# Patient Record
Sex: FEMALE | NOT HISPANIC OR LATINO | Employment: UNEMPLOYED | URBAN - METROPOLITAN AREA
[De-identification: names, ages, dates, MRNs, and addresses within clinical notes are randomized per-mention and may not be internally consistent; named-entity substitution may affect disease eponyms.]

---

## 2023-07-21 ENCOUNTER — OFFICE VISIT (OUTPATIENT)
Dept: PRIMARY CARE | Facility: CLINIC | Age: 38
End: 2023-07-21
Payer: MEDICAID

## 2023-07-21 VITALS
SYSTOLIC BLOOD PRESSURE: 148 MMHG | WEIGHT: 263 LBS | OXYGEN SATURATION: 97 % | DIASTOLIC BLOOD PRESSURE: 97 MMHG | RESPIRATION RATE: 16 BRPM | HEIGHT: 60 IN | BODY MASS INDEX: 51.63 KG/M2 | TEMPERATURE: 98.4 F | HEART RATE: 72 BPM

## 2023-07-21 DIAGNOSIS — E66.01 MORBID OBESITY (MULTI): ICD-10-CM

## 2023-07-21 DIAGNOSIS — Z00.00 HEALTH CARE MAINTENANCE: ICD-10-CM

## 2023-07-21 DIAGNOSIS — R40.0 DAYTIME SLEEPINESS: ICD-10-CM

## 2023-07-21 DIAGNOSIS — R06.83 SNORING: ICD-10-CM

## 2023-07-21 DIAGNOSIS — I10 PRIMARY HYPERTENSION: ICD-10-CM

## 2023-07-21 DIAGNOSIS — R53.83 OTHER FATIGUE: Primary | ICD-10-CM

## 2023-07-21 LAB
ALANINE AMINOTRANSFERASE (SGPT) (U/L) IN SER/PLAS: 20 U/L (ref 7–45)
ALBUMIN (G/DL) IN SER/PLAS: 4.6 G/DL (ref 3.4–5)
ALKALINE PHOSPHATASE (U/L) IN SER/PLAS: 56 U/L (ref 33–110)
ANION GAP IN SER/PLAS: 15 MMOL/L (ref 10–20)
ASPARTATE AMINOTRANSFERASE (SGOT) (U/L) IN SER/PLAS: 15 U/L (ref 9–39)
BILIRUBIN TOTAL (MG/DL) IN SER/PLAS: 2 MG/DL (ref 0–1.2)
CALCIDIOL (25 OH VITAMIN D3) (NG/ML) IN SER/PLAS: 48 NG/ML
CALCIUM (MG/DL) IN SER/PLAS: 9.6 MG/DL (ref 8.6–10.6)
CARBON DIOXIDE, TOTAL (MMOL/L) IN SER/PLAS: 27 MMOL/L (ref 21–32)
CHLORIDE (MMOL/L) IN SER/PLAS: 101 MMOL/L (ref 98–107)
CHOLESTEROL (MG/DL) IN SER/PLAS: 142 MG/DL (ref 0–199)
CHOLESTEROL IN HDL (MG/DL) IN SER/PLAS: 43.1 MG/DL
CHOLESTEROL/HDL RATIO: 3.3
CREATININE (MG/DL) IN SER/PLAS: 0.87 MG/DL (ref 0.5–1.05)
ERYTHROCYTE DISTRIBUTION WIDTH (RATIO) BY AUTOMATED COUNT: 14.3 % (ref 11.5–14.5)
ERYTHROCYTE MEAN CORPUSCULAR HEMOGLOBIN CONCENTRATION (G/DL) BY AUTOMATED: 31.9 G/DL (ref 32–36)
ERYTHROCYTE MEAN CORPUSCULAR VOLUME (FL) BY AUTOMATED COUNT: 95 FL (ref 80–100)
ERYTHROCYTES (10*6/UL) IN BLOOD BY AUTOMATED COUNT: 4.55 X10E12/L (ref 4–5.2)
ESTIMATED AVERAGE GLUCOSE FOR HBA1C: 111 MG/DL
FERRITIN (UG/LL) IN SER/PLAS: 122 UG/L (ref 8–150)
GFR FEMALE: 87 ML/MIN/1.73M2
GLUCOSE (MG/DL) IN SER/PLAS: 95 MG/DL (ref 74–99)
HEMATOCRIT (%) IN BLOOD BY AUTOMATED COUNT: 43.3 % (ref 36–46)
HEMOGLOBIN (G/DL) IN BLOOD: 13.8 G/DL (ref 12–16)
HEMOGLOBIN A1C/HEMOGLOBIN TOTAL IN BLOOD: 5.5 %
IRON (UG/DL) IN SER/PLAS: 127 UG/DL (ref 35–150)
IRON BINDING CAPACITY (UG/DL) IN SER/PLAS: 351 UG/DL (ref 240–445)
IRON SATURATION (%) IN SER/PLAS: 36 % (ref 25–45)
LDL: 82 MG/DL (ref 0–99)
LEUKOCYTES (10*3/UL) IN BLOOD BY AUTOMATED COUNT: 7.1 X10E9/L (ref 4.4–11.3)
NRBC (PER 100 WBCS) BY AUTOMATED COUNT: 0 /100 WBC (ref 0–0)
PLATELETS (10*3/UL) IN BLOOD AUTOMATED COUNT: 292 X10E9/L (ref 150–450)
POTASSIUM (MMOL/L) IN SER/PLAS: 4.1 MMOL/L (ref 3.5–5.3)
PROTEIN TOTAL: 6.9 G/DL (ref 6.4–8.2)
SODIUM (MMOL/L) IN SER/PLAS: 139 MMOL/L (ref 136–145)
THYROTROPIN (MIU/L) IN SER/PLAS BY DETECTION LIMIT <= 0.05 MIU/L: 1.26 MIU/L (ref 0.44–3.98)
TRIGLYCERIDE (MG/DL) IN SER/PLAS: 87 MG/DL (ref 0–149)
UREA NITROGEN (MG/DL) IN SER/PLAS: 12 MG/DL (ref 6–23)
VLDL: 17 MG/DL (ref 0–40)

## 2023-07-21 PROCEDURE — 83036 HEMOGLOBIN GLYCOSYLATED A1C: CPT

## 2023-07-21 PROCEDURE — 3080F DIAST BP >= 90 MM HG: CPT | Performed by: STUDENT IN AN ORGANIZED HEALTH CARE EDUCATION/TRAINING PROGRAM

## 2023-07-21 PROCEDURE — 84443 ASSAY THYROID STIM HORMONE: CPT

## 2023-07-21 PROCEDURE — 3077F SYST BP >= 140 MM HG: CPT | Performed by: STUDENT IN AN ORGANIZED HEALTH CARE EDUCATION/TRAINING PROGRAM

## 2023-07-21 PROCEDURE — 85027 COMPLETE CBC AUTOMATED: CPT

## 2023-07-21 PROCEDURE — 99204 OFFICE O/P NEW MOD 45 MIN: CPT | Performed by: STUDENT IN AN ORGANIZED HEALTH CARE EDUCATION/TRAINING PROGRAM

## 2023-07-21 PROCEDURE — 83540 ASSAY OF IRON: CPT

## 2023-07-21 PROCEDURE — 82306 VITAMIN D 25 HYDROXY: CPT

## 2023-07-21 PROCEDURE — 80053 COMPREHEN METABOLIC PANEL: CPT

## 2023-07-21 PROCEDURE — 80061 LIPID PANEL: CPT

## 2023-07-21 PROCEDURE — 83550 IRON BINDING TEST: CPT

## 2023-07-21 PROCEDURE — 82728 ASSAY OF FERRITIN: CPT

## 2023-07-21 RX ORDER — ACETAMINOPHEN 500 MG
1 TABLET ORAL DAILY
COMMUNITY
Start: 2020-06-01

## 2023-07-21 RX ORDER — LISINOPRIL AND HYDROCHLOROTHIAZIDE 12.5; 2 MG/1; MG/1
1 TABLET ORAL DAILY
COMMUNITY
Start: 2022-11-18 | End: 2023-08-04

## 2023-07-21 ASSESSMENT — ENCOUNTER SYMPTOMS: DEPRESSION: 0

## 2023-07-21 NOTE — PROGRESS NOTES
Subjective   Patient ID: Brit South is a 38 y.o. female who presents for Establish Care and Hypertension.  HPI  Ms. Vasquez is here to establish care.  She has a history of hypertension on lisinopril and hydrochlorothiazide.  Reports she has not taken her medication for the past 2 days.  She also has weight concerns.  Reports ongoing fatigue along with some daytime sleepiness.  Reports to have been noticed to snore by her partner.  Denies any symptoms pertaining to cardiovascular system such as chest pain or shortness of breath or focal neurological deficit.  Past Medical History:   Diagnosis Date    Personal history of other diseases of the circulatory system     History of varicose veins of lower extremity    Personal history of other diseases of the circulatory system     History of hypertension    Urogenital trichomoniasis, unspecified     Trichs - trichomonas vaginalis infection      Past Surgical History:   Procedure Laterality Date    CHOLECYSTECTOMY  2014    Cholecystectomy    OTHER SURGICAL HISTORY  2014    Surgical Treatment Of Spontaneous       No family history on file.   Allergies   Allergen Reactions    Codeine Other        Soptted 6months ago       LMP: mirena         Review of Systems   Constitutional:  Positive for unexpected weight change. Negative for activity change and fever.   HENT:  Negative for congestion.    Respiratory:  Negative for cough, shortness of breath and wheezing.    Cardiovascular:  Negative for chest pain and leg swelling.   Gastrointestinal:  Negative for abdominal pain, constipation, nausea and vomiting.   Endocrine: Negative for cold intolerance.   Genitourinary:  Negative for dysuria, hematuria and urgency.   Neurological:  Negative for dizziness, speech difficulty, weakness and numbness.   Psychiatric/Behavioral:  Negative for self-injury and suicidal ideas.        Objective   Visit Vitals  BP (!) 148/97   Pulse 72   Temp 36.9 °C (98.4 °F)   Resp 16    Ht 1.524 m (5')   Wt 119 kg (263 lb)   SpO2 97%   BMI 51.36 kg/m²   BSA 2.24 m²      Physical Exam  Constitutional:       Appearance: Normal appearance.   HENT:      Head: Normocephalic and atraumatic.      Nose: Nose normal.      Mouth/Throat:      Mouth: Mucous membranes are moist.   Eyes:      Conjunctiva/sclera: Conjunctivae normal.      Pupils: Pupils are equal, round, and reactive to light.   Cardiovascular:      Rate and Rhythm: Normal rate and regular rhythm.      Pulses: Normal pulses.      Heart sounds: Normal heart sounds.   Pulmonary:      Effort: Pulmonary effort is normal.      Breath sounds: Normal breath sounds.   Musculoskeletal:         General: Normal range of motion.      Cervical back: Neck supple.   Skin:     General: Skin is warm.   Neurological:      General: No focal deficit present.      Mental Status: She is alert and oriented to person, place, and time.   Psychiatric:         Mood and Affect: Mood normal.         Behavior: Behavior normal.         Thought Content: Thought content normal.         Judgment: Judgment normal.         Assessment/Plan     Problem List Items Addressed This Visit    None  Visit Diagnoses       Other fatigue    -  Primary    Relevant Orders    Hemoglobin A1C (Completed)    Ferritin (Completed)    Iron and TIBC (Completed)    TSH with reflex to Free T4 if abnormal (Completed)    Vitamin D 25 hydroxy (Completed)    In-Center Sleep Study (Non-Sleep Provider Only)    Daytime sleepiness        Relevant Orders    In-Center Sleep Study (Non-Sleep Provider Only)    Snoring        Relevant Orders    In-Center Sleep Study (Non-Sleep Provider Only)    Health care maintenance        Relevant Orders    Comprehensive Metabolic Panel (Completed)    Lipid Panel (Completed)    CBC (Completed)    Morbid obesity (CMS/HCC)        Relevant Orders    In-Center Sleep Study (Non-Sleep Provider Only)    Primary hypertension            Overall patient is here to establish care.  1.  For  hypertension reports not taking her medication for the past 2 days.  Advised medication compliance, low-salt diet.  To continue lisinopril-hydrochlorothiazide at 20-12 0.5 daily.  Home blood pressure monitoring  To see me for a brief blood pressure check    2.  Fatigue  Including daytime sleepiness and history of snoring, reasonable to get a sleep study.  We will get some blood work including iron studies and thyroid studies to rule out them as contributing causes  Once about results are obtained we will call her with abnormal results if any and discuss further evaluation and management.  To see me in 10 days for a brief blood pressure check or sooner if needed.  At this visit we will discuss at weight loss medication options.  Agreeable to plan.   1

## 2023-07-27 DIAGNOSIS — R17 ELEVATED BILIRUBIN: Primary | ICD-10-CM

## 2023-07-27 LAB
CLUE CELLS: PRESENT
NUGENT SCORE: 10
URINE CULTURE: ABNORMAL
YEAST: ABNORMAL

## 2023-08-04 ENCOUNTER — OFFICE VISIT (OUTPATIENT)
Dept: PRIMARY CARE | Facility: CLINIC | Age: 38
End: 2023-08-04
Payer: MEDICAID

## 2023-08-04 VITALS
HEIGHT: 67 IN | DIASTOLIC BLOOD PRESSURE: 93 MMHG | BODY MASS INDEX: 42.22 KG/M2 | HEART RATE: 62 BPM | RESPIRATION RATE: 17 BRPM | OXYGEN SATURATION: 97 % | SYSTOLIC BLOOD PRESSURE: 142 MMHG | WEIGHT: 269 LBS | TEMPERATURE: 98.1 F

## 2023-08-04 DIAGNOSIS — R17 ELEVATED BILIRUBIN: ICD-10-CM

## 2023-08-04 DIAGNOSIS — I10 PRIMARY HYPERTENSION: ICD-10-CM

## 2023-08-04 DIAGNOSIS — E66.01 MORBID OBESITY (MULTI): Primary | ICD-10-CM

## 2023-08-04 LAB
ALANINE AMINOTRANSFERASE (SGPT) (U/L) IN SER/PLAS: 15 U/L (ref 7–45)
ALBUMIN (G/DL) IN SER/PLAS: 4.3 G/DL (ref 3.4–5)
ALKALINE PHOSPHATASE (U/L) IN SER/PLAS: 55 U/L (ref 33–110)
AMPHETAMINE (PRESENCE) IN URINE BY SCREEN METHOD: ABNORMAL
ASPARTATE AMINOTRANSFERASE (SGOT) (U/L) IN SER/PLAS: 15 U/L (ref 9–39)
BARBITURATES PRESENCE IN URINE BY SCREEN METHOD: ABNORMAL
BENZODIAZEPINE (PRESENCE) IN URINE BY SCREEN METHOD: ABNORMAL
BILIRUBIN DIRECT (MG/DL) IN SER/PLAS: 0.2 MG/DL (ref 0–0.3)
BILIRUBIN TOTAL (MG/DL) IN SER/PLAS: 0.8 MG/DL (ref 0–1.2)
CANNABINOIDS IN URINE BY SCREEN METHOD: ABNORMAL
COCAINE (PRESENCE) IN URINE BY SCREEN METHOD: ABNORMAL
DRUG SCREEN COMMENT URINE: ABNORMAL
FENTANYL URINE: ABNORMAL
METHADONE (PRESENCE) IN URINE BY SCREEN METHOD: ABNORMAL
OPIATES (PRESENCE) IN URINE BY SCREEN METHOD: ABNORMAL
OXYCODONE (PRESENCE) IN URINE BY SCREEN METHOD: ABNORMAL
PHENCYCLIDINE (PRESENCE) IN URINE BY SCREEN METHOD: ABNORMAL
PROTEIN TOTAL: 7.1 G/DL (ref 6.4–8.2)

## 2023-08-04 PROCEDURE — 99214 OFFICE O/P EST MOD 30 MIN: CPT | Performed by: STUDENT IN AN ORGANIZED HEALTH CARE EDUCATION/TRAINING PROGRAM

## 2023-08-04 PROCEDURE — 80076 HEPATIC FUNCTION PANEL: CPT

## 2023-08-04 PROCEDURE — 3080F DIAST BP >= 90 MM HG: CPT | Performed by: STUDENT IN AN ORGANIZED HEALTH CARE EDUCATION/TRAINING PROGRAM

## 2023-08-04 PROCEDURE — 80307 DRUG TEST PRSMV CHEM ANLYZR: CPT

## 2023-08-04 PROCEDURE — 80349 CANNABINOIDS NATURAL: CPT

## 2023-08-04 PROCEDURE — 3077F SYST BP >= 140 MM HG: CPT | Performed by: STUDENT IN AN ORGANIZED HEALTH CARE EDUCATION/TRAINING PROGRAM

## 2023-08-04 RX ORDER — HYDROCHLOROTHIAZIDE 12.5 MG/1
12.5 TABLET ORAL DAILY
Qty: 30 TABLET | Refills: 1 | Status: SHIPPED | OUTPATIENT
Start: 2023-08-04 | End: 2023-11-30

## 2023-08-04 RX ORDER — SEMAGLUTIDE 0.25 MG/.5ML
0.25 INJECTION, SOLUTION SUBCUTANEOUS
Qty: 2 ML | Refills: 0 | Status: SHIPPED | OUTPATIENT
Start: 2023-08-04 | End: 2023-08-14

## 2023-08-04 RX ORDER — LISINOPRIL 40 MG/1
40 TABLET ORAL DAILY
Qty: 30 TABLET | Refills: 1 | Status: SHIPPED | OUTPATIENT
Start: 2023-08-04 | End: 2023-11-30

## 2023-08-04 ASSESSMENT — ENCOUNTER SYMPTOMS
WEAKNESS: 0
DYSURIA: 0
ACTIVITY CHANGE: 0
SPEECH DIFFICULTY: 0
SHORTNESS OF BREATH: 0
VOMITING: 0
WEAKNESS: 0
NAUSEA: 0
UNEXPECTED WEIGHT CHANGE: 1
DIZZINESS: 0
DYSURIA: 0
COUGH: 0
FEVER: 0
COUGH: 0
NUMBNESS: 0
HEMATURIA: 0
HEMATURIA: 0
WHEEZING: 0
WHEEZING: 0
SHORTNESS OF BREATH: 0
CONSTIPATION: 0
VOMITING: 0
SPEECH DIFFICULTY: 0
ABDOMINAL PAIN: 0
NAUSEA: 0
NUMBNESS: 0
CONSTIPATION: 0
ABDOMINAL PAIN: 0
DEPRESSION: 0
ACTIVITY CHANGE: 0
FEVER: 0
DIZZINESS: 0

## 2023-08-04 NOTE — PROGRESS NOTES
"Subjective   Patient ID: Brit South is a 38 y.o. female who presents for Follow-up.  HPI  Ms. South is 38 years old here for follow-up.  Has weight concerns    Past Medical History:   Diagnosis Date    Personal history of other diseases of the circulatory system     History of varicose veins of lower extremity    Personal history of other diseases of the circulatory system     History of hypertension    Urogenital trichomoniasis, unspecified     Trichs - trichomonas vaginalis infection      Past Surgical History:   Procedure Laterality Date    CHOLECYSTECTOMY  2014    Cholecystectomy    OTHER SURGICAL HISTORY  2014    Surgical Treatment Of Spontaneous           Occupation:     Review of Systems   Constitutional:  Negative for activity change and fever.   HENT:  Negative for congestion.    Respiratory:  Negative for cough, shortness of breath and wheezing.    Cardiovascular:  Negative for chest pain and leg swelling.   Gastrointestinal:  Negative for abdominal pain, constipation, nausea and vomiting.   Endocrine: Negative for cold intolerance.   Genitourinary:  Negative for dysuria, hematuria and urgency.   Neurological:  Negative for dizziness, speech difficulty, weakness and numbness.   Psychiatric/Behavioral:  Negative for self-injury and suicidal ideas.        Objective   Visit Vitals  BP (!) 142/93   Pulse 62   Temp 36.7 °C (98.1 °F)   Resp 17   Ht 1.702 m (5' 7\")   Wt 122 kg (269 lb)   SpO2 97%   BMI 42.13 kg/m²   BSA 2.4 m²      Physical Exam  Constitutional:       Appearance: Normal appearance.   HENT:      Head: Normocephalic and atraumatic.      Nose: Nose normal.      Mouth/Throat:      Mouth: Mucous membranes are moist.   Eyes:      Conjunctiva/sclera: Conjunctivae normal.      Pupils: Pupils are equal, round, and reactive to light.   Cardiovascular:      Rate and Rhythm: Normal rate and regular rhythm.      Pulses: Normal pulses.      Heart sounds: Normal heart sounds. "   Pulmonary:      Effort: Pulmonary effort is normal.      Breath sounds: Normal breath sounds.   Musculoskeletal:         General: Normal range of motion.      Cervical back: Neck supple.   Skin:     General: Skin is warm.   Neurological:      General: No focal deficit present.      Mental Status: She is alert and oriented to person, place, and time.   Psychiatric:         Mood and Affect: Mood normal.         Behavior: Behavior normal.         Thought Content: Thought content normal.         Judgment: Judgment normal.             Assessment/Plan         Problem List Items Addressed This Visit    None  Visit Diagnoses       Morbid obesity (CMS/HCC)    -  Primary    Relevant Medications    semaglutide, weight loss, (Wegovy) 0.25 mg/0.5 mL pen injector    Other Relevant Orders    Drug Screen, Urine With Reflex to Confirmation    Referral to Nutrition Services    Primary hypertension        Relevant Medications    lisinopril 40 mg tablet    hydroCHLOROthiazide (HYDRODiuril) 12.5 mg tablet    Elevated bilirubin            Ms. Vasquez mainly here for follow-up.  Has weight concerns.  1.  Primary hypertension  Not at goal  Currently on lisinopril 20-hydrochlorothiazide combination pill  Advised to stop the above and start lisinopril 40 and hydrochlorothiazide 12.5.  Verbalizes understanding.  Continue low-salt diet and lifestyle modification  To see me in 1 month for a brief blood pressure check  2.  BMI more than 40/morbid obesity  We discussed various weight loss options including metformin, GLP-1 agonist, Adipex.  Reports recreational intake of marijuana.  Therefore wants to go with GLP-1 agonists.  Discussed Wegovy/semaglutide/liraglutide.  Discussed side effects/adverse effects including medullary thyroid cancer, acute pancreatitis, kidney injury.  Verbalizes understanding  All questions answered  Agreeable to get medications  Nutrition referral placed.

## 2023-08-14 DIAGNOSIS — E66.01 MORBID OBESITY (MULTI): Primary | ICD-10-CM

## 2023-08-14 RX ORDER — LIRAGLUTIDE 6 MG/ML
0.6 INJECTION, SOLUTION SUBCUTANEOUS DAILY
Qty: 3 ML | Refills: 0 | Status: SHIPPED | OUTPATIENT
Start: 2023-08-14 | End: 2024-01-05 | Stop reason: SDUPTHER

## 2023-08-16 LAB — 11-NOR-9-CARBOXY-THC, URN, QUANT: 30 NG/ML

## 2023-11-29 DIAGNOSIS — I10 PRIMARY HYPERTENSION: ICD-10-CM

## 2023-11-30 RX ORDER — LISINOPRIL 40 MG/1
40 TABLET ORAL DAILY
Qty: 30 TABLET | Refills: 1 | Status: SHIPPED | OUTPATIENT
Start: 2023-11-30 | End: 2024-01-05 | Stop reason: SDUPTHER

## 2023-11-30 RX ORDER — HYDROCHLOROTHIAZIDE 12.5 MG/1
12.5 TABLET ORAL DAILY
Qty: 30 TABLET | Refills: 1 | Status: SHIPPED | OUTPATIENT
Start: 2023-11-30 | End: 2024-01-05 | Stop reason: SDUPTHER

## 2024-01-05 ENCOUNTER — OFFICE VISIT (OUTPATIENT)
Dept: PRIMARY CARE | Facility: CLINIC | Age: 39
End: 2024-01-05
Payer: MEDICAID

## 2024-01-05 VITALS
HEIGHT: 67 IN | DIASTOLIC BLOOD PRESSURE: 100 MMHG | HEART RATE: 80 BPM | WEIGHT: 270 LBS | SYSTOLIC BLOOD PRESSURE: 142 MMHG | BODY MASS INDEX: 42.38 KG/M2 | OXYGEN SATURATION: 98 %

## 2024-01-05 DIAGNOSIS — R53.83 OTHER FATIGUE: ICD-10-CM

## 2024-01-05 DIAGNOSIS — R06.83 SNORING: ICD-10-CM

## 2024-01-05 DIAGNOSIS — E66.01 MORBID OBESITY (MULTI): ICD-10-CM

## 2024-01-05 DIAGNOSIS — J34.3 NASAL TURBINATE HYPERTROPHY: Primary | ICD-10-CM

## 2024-01-05 DIAGNOSIS — R40.0 DAYTIME SLEEPINESS: ICD-10-CM

## 2024-01-05 DIAGNOSIS — I10 PRIMARY HYPERTENSION: ICD-10-CM

## 2024-01-05 PROCEDURE — 99214 OFFICE O/P EST MOD 30 MIN: CPT | Performed by: STUDENT IN AN ORGANIZED HEALTH CARE EDUCATION/TRAINING PROGRAM

## 2024-01-05 PROCEDURE — 3077F SYST BP >= 140 MM HG: CPT | Performed by: STUDENT IN AN ORGANIZED HEALTH CARE EDUCATION/TRAINING PROGRAM

## 2024-01-05 PROCEDURE — 3080F DIAST BP >= 90 MM HG: CPT | Performed by: STUDENT IN AN ORGANIZED HEALTH CARE EDUCATION/TRAINING PROGRAM

## 2024-01-05 RX ORDER — HYDROCHLOROTHIAZIDE 25 MG/1
25 TABLET ORAL DAILY
Qty: 30 TABLET | Refills: 2 | Status: SHIPPED | OUTPATIENT
Start: 2024-01-05 | End: 2024-04-04

## 2024-01-05 RX ORDER — LIRAGLUTIDE 6 MG/ML
0.6 INJECTION, SOLUTION SUBCUTANEOUS DAILY
Qty: 3 ML | Refills: 0 | Status: SHIPPED | OUTPATIENT
Start: 2024-01-05 | End: 2024-02-04

## 2024-01-05 RX ORDER — LISINOPRIL 40 MG/1
40 TABLET ORAL DAILY
Qty: 30 TABLET | Refills: 1 | Status: SHIPPED | OUTPATIENT
Start: 2024-01-05 | End: 2024-04-29 | Stop reason: SDUPTHER

## 2024-01-05 NOTE — PROGRESS NOTES
"Subjective   Patient ID: Brit South is a 38 y.o. female who presents for  sick visit; weight concerns  HPI  Ms. South is 38 years old presents to our office with the following concerns  1.  Follow-up of hypertension  2.  Weight gain-reports increasing weight gain.  Has tried lifestyle modifications including exercise and calorie restriction diet  3.  Complains of fatigue and daytime sleepiness.  Also reports to snore.  Reports to have increased allergies       Past Medical History:   Diagnosis Date    Personal history of other diseases of the circulatory system     History of varicose veins of lower extremity    Personal history of other diseases of the circulatory system     History of hypertension    Urogenital trichomoniasis, unspecified     Trichs - trichomonas vaginalis infection      Past Surgical History:   Procedure Laterality Date    CHOLECYSTECTOMY  2014    Cholecystectomy    OTHER SURGICAL HISTORY  2014    Surgical Treatment Of Spontaneous       No family history on file.   Allergies   Allergen Reactions    Codeine Other          Occupation:     Review of Systems   Constitutional:  Positive for fatigue and unexpected weight change. Negative for activity change and fever.   HENT:  Negative for congestion.    Respiratory:  Negative for cough, shortness of breath and wheezing.    Cardiovascular:  Negative for chest pain and leg swelling.   Gastrointestinal:  Negative for abdominal pain, constipation, nausea and vomiting.   Endocrine: Negative for cold intolerance.   Genitourinary:  Negative for dysuria, hematuria and urgency.   Neurological:  Negative for dizziness, speech difficulty, weakness and numbness.   Psychiatric/Behavioral:  Negative for self-injury and suicidal ideas.        Objective   Visit Vitals  BP (!) 142/100   Pulse 80   Ht 1.702 m (5' 7\")   Wt 122 kg (270 lb)   SpO2 98%   BMI 42.29 kg/m²   BSA 2.4 m²      Physical Exam  Constitutional:       Appearance: Normal " appearance.   HENT:      Head: Normocephalic and atraumatic.      Nose: Nose normal. No laceration.      Right Turbinates: Enlarged.      Left Turbinates: Enlarged.      Mouth/Throat:      Mouth: Mucous membranes are moist.   Eyes:      Conjunctiva/sclera: Conjunctivae normal.      Pupils: Pupils are equal, round, and reactive to light.   Cardiovascular:      Rate and Rhythm: Normal rate and regular rhythm.      Pulses: Normal pulses.      Heart sounds: Normal heart sounds.   Pulmonary:      Effort: Pulmonary effort is normal.      Breath sounds: Normal breath sounds.   Musculoskeletal:         General: Normal range of motion.      Cervical back: Neck supple.   Skin:     General: Skin is warm.   Neurological:      General: No focal deficit present.      Mental Status: She is alert and oriented to person, place, and time.   Psychiatric:         Mood and Affect: Mood normal.         Behavior: Behavior normal.         Thought Content: Thought content normal.         Judgment: Judgment normal.         Assessment/Plan   Diagnoses and all orders for this visit:  Nasal turbinate hypertrophy  Allergies and nasal turbinate hypertrophy.  Advised to see ENT for a follow-up-     Referral to ENT; Future  Morbid obesity (CMS/HCC)  We discussed weight loss options including Adipex, metformin, injectables.  Agreeable to take Saxenda.  We discussed pregnancy, medullary thyroid cancer and acute pancreatitis.  No family history of above reported  She does use marijuana, does not want to take metformin.  Reports she is willing to pay for Saxenda out-of-pocket    -     liraglutide, weight loss, (Saxenda) 3 mg/0.5 mL (18 mg/3 mL) pen injector injection; Inject 0.1 mL (0.6 mg) under the skin once daily.  -     Home sleep apnea test (HSAT); Future  Primary hypertension  Noted goal advised to increase hydrochlorothiazide to 25 mg and continue lisinopril at 40 mg  Low-salt diet discussed  -     hydroCHLOROthiazide (HYDRODiuril) 25 mg tablet;  Take 1 tablet (25 mg) by mouth once daily.  -     lisinopril 40 mg tablet; Take 1 tablet (40 mg) by mouth once daily.  Other fatigue  Given the snoring, daytime sleepiness and fatigue, agreeable to get home sleep apnea test, thyroid and iron panel within normal limits last labs.  -     Home sleep apnea test (HSAT); Future  Daytime sleepiness  -     Home sleep apnea test (HSAT); Future  Snoring  -     Home sleep apnea test (HSAT); Future    Follow-up in 1 month for a brief blood pressure check or sooner as needed

## 2024-01-08 ENCOUNTER — APPOINTMENT (OUTPATIENT)
Dept: ORTHOPEDIC SURGERY | Facility: HOSPITAL | Age: 39
End: 2024-01-08
Payer: MEDICAID

## 2024-01-12 ENCOUNTER — HOSPITAL ENCOUNTER (OUTPATIENT)
Dept: RADIOLOGY | Facility: HOSPITAL | Age: 39
Discharge: HOME | End: 2024-01-12
Payer: MEDICAID

## 2024-01-12 DIAGNOSIS — M25.562 LEFT KNEE PAIN, UNSPECIFIED CHRONICITY: ICD-10-CM

## 2024-01-12 DIAGNOSIS — M25.571 RIGHT ANKLE PAIN, UNSPECIFIED CHRONICITY: ICD-10-CM

## 2024-01-23 ENCOUNTER — APPOINTMENT (OUTPATIENT)
Dept: OBSTETRICS AND GYNECOLOGY | Facility: HOSPITAL | Age: 39
End: 2024-01-23
Payer: MEDICAID

## 2024-01-24 ENCOUNTER — OFFICE VISIT (OUTPATIENT)
Dept: ORTHOPEDIC SURGERY | Facility: HOSPITAL | Age: 39
End: 2024-01-24
Payer: MEDICAID

## 2024-01-24 ENCOUNTER — APPOINTMENT (OUTPATIENT)
Dept: RADIOLOGY | Facility: HOSPITAL | Age: 39
End: 2024-01-24
Payer: MEDICAID

## 2024-01-24 ENCOUNTER — HOSPITAL ENCOUNTER (OUTPATIENT)
Dept: RADIOLOGY | Facility: HOSPITAL | Age: 39
Discharge: HOME | End: 2024-01-24
Payer: MEDICAID

## 2024-01-24 DIAGNOSIS — M25.571 RIGHT ANKLE PAIN, UNSPECIFIED CHRONICITY: ICD-10-CM

## 2024-01-24 DIAGNOSIS — E66.01 CLASS 3 SEVERE OBESITY DUE TO EXCESS CALORIES WITHOUT SERIOUS COMORBIDITY WITH BODY MASS INDEX (BMI) OF 40.0 TO 44.9 IN ADULT (MULTI): ICD-10-CM

## 2024-01-24 DIAGNOSIS — R22.41 ANKLE MASS, RIGHT: ICD-10-CM

## 2024-01-24 DIAGNOSIS — M25.562 PATELLOFEMORAL INSTABILITY OF LEFT KNEE WITH PAIN: Primary | ICD-10-CM

## 2024-01-24 DIAGNOSIS — M25.562 LEFT KNEE PAIN, UNSPECIFIED CHRONICITY: ICD-10-CM

## 2024-01-24 DIAGNOSIS — M76.32 IT BAND SYNDROME, LEFT: ICD-10-CM

## 2024-01-24 DIAGNOSIS — M25.362 PATELLOFEMORAL INSTABILITY OF LEFT KNEE WITH PAIN: Primary | ICD-10-CM

## 2024-01-24 PROBLEM — E66.813 CLASS 3 SEVERE OBESITY DUE TO EXCESS CALORIES WITHOUT SERIOUS COMORBIDITY WITH BODY MASS INDEX (BMI) OF 40.0 TO 44.9 IN ADULT: Status: ACTIVE | Noted: 2024-01-24

## 2024-01-24 PROCEDURE — 99204 OFFICE O/P NEW MOD 45 MIN: CPT | Performed by: FAMILY MEDICINE

## 2024-01-24 PROCEDURE — 99214 OFFICE O/P EST MOD 30 MIN: CPT | Performed by: FAMILY MEDICINE

## 2024-01-24 PROCEDURE — 73610 X-RAY EXAM OF ANKLE: CPT | Mod: RT

## 2024-01-24 PROCEDURE — 73610 X-RAY EXAM OF ANKLE: CPT | Mod: RIGHT SIDE | Performed by: RADIOLOGY

## 2024-01-24 PROCEDURE — 3008F BODY MASS INDEX DOCD: CPT | Performed by: FAMILY MEDICINE

## 2024-01-24 PROCEDURE — 73564 X-RAY EXAM KNEE 4 OR MORE: CPT | Mod: LT

## 2024-01-24 PROCEDURE — L1812 KO ELASTIC W/JOINTS PRE OTS: HCPCS | Performed by: FAMILY MEDICINE

## 2024-01-24 PROCEDURE — 73564 X-RAY EXAM KNEE 4 OR MORE: CPT | Mod: LEFT SIDE | Performed by: RADIOLOGY

## 2024-01-24 ASSESSMENT — PAIN - FUNCTIONAL ASSESSMENT: PAIN_FUNCTIONAL_ASSESSMENT: 0-10

## 2024-01-24 ASSESSMENT — PAIN SCALES - GENERAL: PAINLEVEL_OUTOF10: 8

## 2024-01-24 NOTE — PROGRESS NOTES
Sports Medicine Office Note    Today's Date: 1/24/2024     HPI: Brit South is a 38 y.o. part-time  who presents today for left knee pain and right ankle mass.    Her left knee has been bothering her for about 5 years, when she endured a fall onto her knee.  She did not have a fracture.  She now complains of knee intermittently swelling, pain with knee flexion activities such as stairs.  She also feels popping in her knee all the time and rolling in bed even hurts.  This is the first time she is seeking medical care for this.  She has never had PT or injections in past.  She takes ibuprofen 200 to 400 mg intermittently for this.    She also complains of a small cyst on the medial aspect of her ankle which has been there for the past 3 to 4 years.  Records reviewed, she had seen an orthopedic surgeon at the [facility] Dr. Morales on 1/13/2021 who had recommended advanced imaging and referral to Dr. Javier. She has not had an MRI or followed up with another specialist. She does not have a personal history of gout or diabetes.  This does not cause her pain, but she would like to have it removed as she does not like it there. No growth over the years. Denies any ankle trauma.    Physical Examination:     The Left knee is without obvious signs of acute bony deformity, swelling, erythema, ecchymosis. Trace joint effusion. The patella is without tenderness. Apprehension is negative with medial and lateral glide. Patella crepitus is positive. Patella grind is positive. The medial joint line is tender and without bony crepitus or step-off. The lateral joint line is tender and without bony crepitus or step-off. \Tenderness of the distal IT band.  Flexion & extension are full and symmetrical.  Laxity but no pain with valgus stress test at 30 degrees of flexion.  Varus stress test at 0° and 30° of flexion, Lachman's, and Bakari's are all negative. The opposite knee is nontender and stable. Gait  is pain-free and tandem.    The Right ankle is without obvious signs of acute bony deformity, swelling, erythema or ecchymosis.  Tenderness over the ATFL and CFL bilaterally.  A small 1 to 2 cm mobile soft cyst is palpated inferior to the medial malleolus which is nontender. There is no tenderness to the medial joint line. There is no tenderness to the lateral joint line. There is no bony crepitus or step-off. Active range of motion is full, pain-free and symmetrical. Passive range of motion is full, pain-free and symmetrical.  Laxity but no pain with anterior drawer on the right.  Instability test are negative with talar tilt and eversion stress tests. Forbes's test and Homans sign are negative. Strength is normal compared to the opposite ankle. The opposite ankle is otherwise normal and stable. Gait is pain-free and tandem.      Imaging:  Radiographs of the left knee obtained today were reviewed and personally interpreted, which revealed mild joint space narrowing in the medial compartment to treat only.  Radiographs of the right ankle obtained today were reviewed and personally interpreted, which revealed no acute fracture or osteochondral defects, or dislocation.  The studies were reviewed with Dr. Abad personally in the office today.    Problem List Items Addressed This Visit             ICD-10-CM    Left knee pain M25.562    Relevant Orders    XR knee left 4+ views    Right ankle pain M25.571    Relevant Orders    XR ankle right 3+ views    Patellofemoral instability of left knee with pain - Primary M25.362, M25.562    Relevant Orders    Referral to Physical Therapy    Knee Brace, Hinged    It band syndrome, left M76.32    Relevant Orders    Referral to Physical Therapy    Ankle mass, right R22.41    Class 3 severe obesity due to excess calories without serious comorbidity with body mass index (BMI) of 40.0 to 44.9 in adult (CMS/[facility]) E66.01, Z68.41       Assessment and Plan:     We reviewed the exam and  x-ray findings and discussed the conservative and surgical treatment options. We agreed her left knee patellofemoral syndrome and IT band syndrome with from physical therapy and pain control with Naproxen for 1 month. We will also prescribe a trenton pull light patellar stabilizing orthotic for knee support.   Referral to Dr. Maldonado provided today for management of her right ankle cyst, but we recommended that she it may be better observing it since it does not bother her.  If knee pain does not improve in the next 4 to 6 weeks, return to office.    Patient was prescribed a trenton pull light patellar stabilizing orthotic for left knee pain and instability. The patient is ambulatory with or without aid; but, has weakness, instability and/or deformity of their left knee which requires stabilization from this orthosis to improve their function.      Verbal and written instructions for the use, wear schedule, cleaning and application of this item were given.  Patient was instructed that should the brace result in increased pain, decreased sensation, increased swelling, or an overall worsening of their medical condition, to please contact our office immediately.     Orthotic management and training was provided for skin care, modifications due to healing tissues, edema changes, interruption in skin integrity, and safety precautions with the orthosis.    **This note was dictated using Dragon speech recognition software and was not corrected for spelling or grammatical errors**.    Burak Boston DO  Sports Medicine Fellow  [facility] [place]

## 2024-01-31 ASSESSMENT — ENCOUNTER SYMPTOMS
WEAKNESS: 0
CONSTIPATION: 0
HEMATURIA: 0
NUMBNESS: 0
DIZZINESS: 0
VOMITING: 0
UNEXPECTED WEIGHT CHANGE: 1
ABDOMINAL PAIN: 0
WHEEZING: 0
NAUSEA: 0
DYSURIA: 0
SPEECH DIFFICULTY: 0
ACTIVITY CHANGE: 0
COUGH: 0
SHORTNESS OF BREATH: 0
FEVER: 0
FATIGUE: 1

## 2024-03-06 ENCOUNTER — APPOINTMENT (OUTPATIENT)
Dept: ORTHOPEDIC SURGERY | Facility: HOSPITAL | Age: 39
End: 2024-03-06
Payer: MEDICAID

## 2024-04-29 DIAGNOSIS — I10 PRIMARY HYPERTENSION: ICD-10-CM

## 2024-05-01 RX ORDER — LISINOPRIL 40 MG/1
40 TABLET ORAL DAILY
Qty: 30 TABLET | Refills: 1 | Status: SHIPPED | OUTPATIENT
Start: 2024-05-01

## 2024-08-02 ENCOUNTER — APPOINTMENT (OUTPATIENT)
Dept: PRIMARY CARE | Facility: CLINIC | Age: 39
End: 2024-08-02
Payer: MEDICAID

## 2024-08-13 ENCOUNTER — APPOINTMENT (OUTPATIENT)
Dept: PRIMARY CARE | Facility: CLINIC | Age: 39
End: 2024-08-13
Payer: MEDICAID

## 2024-09-03 ENCOUNTER — APPOINTMENT (OUTPATIENT)
Dept: PRIMARY CARE | Facility: CLINIC | Age: 39
End: 2024-09-03
Payer: MEDICAID

## 2024-09-09 ENCOUNTER — APPOINTMENT (OUTPATIENT)
Dept: OBSTETRICS AND GYNECOLOGY | Facility: CLINIC | Age: 39
End: 2024-09-09
Payer: MEDICAID

## 2024-09-17 ENCOUNTER — APPOINTMENT (OUTPATIENT)
Dept: PRIMARY CARE | Facility: CLINIC | Age: 39
End: 2024-09-17
Payer: MEDICAID

## 2024-12-10 ENCOUNTER — HOSPITAL ENCOUNTER (EMERGENCY)
Facility: HOSPITAL | Age: 39
Discharge: HOME | End: 2024-12-10
Payer: MEDICAID

## 2024-12-10 VITALS
TEMPERATURE: 97.7 F | HEART RATE: 74 BPM | BODY MASS INDEX: 39.24 KG/M2 | HEIGHT: 67 IN | SYSTOLIC BLOOD PRESSURE: 120 MMHG | OXYGEN SATURATION: 100 % | WEIGHT: 250 LBS | RESPIRATION RATE: 18 BRPM | DIASTOLIC BLOOD PRESSURE: 82 MMHG

## 2024-12-10 DIAGNOSIS — R35.0 INCREASED URINARY FREQUENCY: Primary | ICD-10-CM

## 2024-12-10 LAB
APPEARANCE UR: ABNORMAL
BACTERIA #/AREA URNS AUTO: ABNORMAL /HPF
BILIRUB UR STRIP.AUTO-MCNC: NEGATIVE MG/DL
COLOR UR: ABNORMAL
GLUCOSE UR STRIP.AUTO-MCNC: NORMAL MG/DL
HCG UR QL IA.RAPID: NEGATIVE
KETONES UR STRIP.AUTO-MCNC: NEGATIVE MG/DL
LEUKOCYTE ESTERASE UR QL STRIP.AUTO: NEGATIVE
NITRITE UR QL STRIP.AUTO: NEGATIVE
PH UR STRIP.AUTO: 6 [PH]
PROT UR STRIP.AUTO-MCNC: NEGATIVE MG/DL
RBC # UR STRIP.AUTO: ABNORMAL /UL
RBC #/AREA URNS AUTO: ABNORMAL /HPF
SP GR UR STRIP.AUTO: 1.01
SQUAMOUS #/AREA URNS AUTO: ABNORMAL /HPF
UROBILINOGEN UR STRIP.AUTO-MCNC: NORMAL MG/DL
WBC #/AREA URNS AUTO: ABNORMAL /HPF

## 2024-12-10 PROCEDURE — 81025 URINE PREGNANCY TEST: CPT

## 2024-12-10 PROCEDURE — 81001 URINALYSIS AUTO W/SCOPE: CPT

## 2024-12-10 PROCEDURE — 99283 EMERGENCY DEPT VISIT LOW MDM: CPT

## 2024-12-10 ASSESSMENT — COLUMBIA-SUICIDE SEVERITY RATING SCALE - C-SSRS
6. HAVE YOU EVER DONE ANYTHING, STARTED TO DO ANYTHING, OR PREPARED TO DO ANYTHING TO END YOUR LIFE?: NO
2. HAVE YOU ACTUALLY HAD ANY THOUGHTS OF KILLING YOURSELF?: NO
1. IN THE PAST MONTH, HAVE YOU WISHED YOU WERE DEAD OR WISHED YOU COULD GO TO SLEEP AND NOT WAKE UP?: NO

## 2024-12-10 ASSESSMENT — LIFESTYLE VARIABLES
HAVE YOU EVER FELT YOU SHOULD CUT DOWN ON YOUR DRINKING: NO
HAVE PEOPLE ANNOYED YOU BY CRITICIZING YOUR DRINKING: NO
EVER FELT BAD OR GUILTY ABOUT YOUR DRINKING: NO

## 2024-12-10 ASSESSMENT — PAIN - FUNCTIONAL ASSESSMENT: PAIN_FUNCTIONAL_ASSESSMENT: 0-10

## 2024-12-10 ASSESSMENT — PAIN SCALES - GENERAL: PAINLEVEL_OUTOF10: 0 - NO PAIN

## 2024-12-11 LAB — HOLD SPECIMEN: NORMAL

## 2024-12-11 NOTE — ED PROVIDER NOTES
HPI   Chief Complaint   Patient presents with    Urinary Frequency       Patient is a 39-year-old female who presents emergency department for evaluation of concern for possible urinary tract infection.  Patient states that for the last few days she has had some increased urinary frequency.  She states that she has had some low back pain on the left side and is concerned she may have a urinary tract infection as this feels similar to episode she has had in the past.  She denies any burning with urination and denies any abdominal pain.  She denies any changes in bowel movements, nausea, vomiting, fevers, chills, lightheadedness, dizziness, or other symptoms at this time.      History provided by:  Patient   used: No            Patient History   Past Medical History:   Diagnosis Date    Personal history of other diseases of the circulatory system     History of varicose veins of lower extremity    Personal history of other diseases of the circulatory system     History of hypertension    Urogenital trichomoniasis, unspecified     Trichs - trichomonas vaginalis infection     Past Surgical History:   Procedure Laterality Date    CHOLECYSTECTOMY  2014    Cholecystectomy    OTHER SURGICAL HISTORY  2014    Surgical Treatment Of Spontaneous      No family history on file.  Social History     Tobacco Use    Smoking status: Not on file    Smokeless tobacco: Not on file   Substance Use Topics    Alcohol use: Not on file    Drug use: Not on file       Physical Exam   ED Triage Vitals [12/10/24 1920]   Temperature Heart Rate Respirations BP   36.5 °C (97.7 °F) 74 18 120/82      Pulse Ox Temp Source Heart Rate Source Patient Position   100 % Temporal -- Sitting      BP Location FiO2 (%)     Right arm --       Physical Exam  Constitutional:       Appearance: Normal appearance.   Cardiovascular:      Rate and Rhythm: Normal rate and regular rhythm.   Pulmonary:      Effort: Pulmonary effort  is normal.      Breath sounds: Normal breath sounds.   Abdominal:      General: Abdomen is flat.      Palpations: Abdomen is soft.      Tenderness: There is no abdominal tenderness. There is no right CVA tenderness or left CVA tenderness.   Musculoskeletal:         General: Normal range of motion.   Skin:     General: Skin is warm and dry.   Neurological:      General: No focal deficit present.      Mental Status: She is alert and oriented to person, place, and time.           ED Course & MDM   Diagnoses as of 12/11/24 2222   Increased urinary frequency                 No data recorded     Evita Coma Scale Score: 15 (12/10/24 2157 : Bria Hannah LPN)                           Medical Decision Making  Patient is a 39-year-old female presents emergency department for evaluation of increased urinary frequency and left low back pain.    Lab work done today included urinalysis, urine pregnancy.  Urinalysis without significant abnormality with negative urine pregnancy.    Scans not warranted at today's visit.    Medications not given at today's visit    I saw this patient independently.  Patient otherwise well-appearing with normal urine and no evidence for urinary tract infection.  Suspect patient symptoms of left-sided low back pain today likely more musculoskeletal in nature as she is not having any increased frequency or burning with urination while in the emergency department.  She is educated to follow-up closely with primary care provider in the following week.  No indication for any further imaging or workup at this time.  She is agreeable plan discharge at this time.  Emergent pathologies were considered for this patient, although I have low suspicion for anything acutely emergent given patient's clinical presentation, history, physical exam, stable vital signs, and relatively unremarkable workup.  Discharging patient home is reasonable plan of care for outpatient management.    All labs, imaging, and  diagnostic studies were reviewed by me and patient was counseled on clinical impression, expectations, and plan.  Patient was educated to follow-up with PCP in the following 1-2 days.  All questions from patient were answered. They elicited understanding and were agreeable to course of treatment.  Patient was discharged in stable condition and given strict return precautions.    ** Disclaimer:  Parts of this document were written utilizing a voice to text dictation software.  Note may contain minor transcription or typographical errors that were inadvertently transcribed by the computer software.        Procedure  Procedures     Kaity Barrera PA-C  12/11/24 5052

## 2024-12-11 NOTE — DISCHARGE INSTRUCTIONS
Please follow close with primary care provider in the following week.  New provider given to call and schedule appointment for follow-up with.  Continue Tylenol ibuprofen as needed for aches and pains.